# Patient Record
Sex: MALE | Race: WHITE | URBAN - METROPOLITAN AREA
[De-identification: names, ages, dates, MRNs, and addresses within clinical notes are randomized per-mention and may not be internally consistent; named-entity substitution may affect disease eponyms.]

---

## 2017-02-20 ENCOUNTER — OFFICE VISIT (OUTPATIENT)
Dept: FAMILY MEDICINE CLINIC | Facility: CLINIC | Age: 32
End: 2017-02-20

## 2017-02-20 VITALS
WEIGHT: 262 LBS | SYSTOLIC BLOOD PRESSURE: 132 MMHG | HEART RATE: 94 BPM | DIASTOLIC BLOOD PRESSURE: 82 MMHG | RESPIRATION RATE: 16 BRPM | BODY MASS INDEX: 39 KG/M2 | OXYGEN SATURATION: 97 % | TEMPERATURE: 98 F

## 2017-02-20 DIAGNOSIS — L03.011 PARONYCHIA OF FINGER OF RIGHT HAND: Primary | ICD-10-CM

## 2017-02-20 PROCEDURE — 99213 OFFICE O/P EST LOW 20 MIN: CPT | Performed by: PHYSICIAN ASSISTANT

## 2017-02-20 RX ORDER — SULFAMETHOXAZOLE AND TRIMETHOPRIM 800; 160 MG/1; MG/1
1 TABLET ORAL 2 TIMES DAILY
Qty: 14 TABLET | Refills: 0 | Status: SHIPPED | OUTPATIENT
Start: 2017-02-20 | End: 2017-02-27

## 2017-02-20 NOTE — PROGRESS NOTES
CHIEF COMPLAINT:   Patient presents with:  Finger Pain: right hand 3rd digit. HPI:     Modesto Almodovar is a 33year old male who presents with concerns of skin infection of the right 3rd finger along the nailbed.  Patient first noticed symptoms yesterda EXTREMITIES: no cyanosis, clubbing or edema. Cap refill brisk- less than 2 seconds.  .        ASSESSMENT AND PLAN:     ASSESSMENT:  Paronychia of finger of right hand  (primary encounter diagnosis)    PLAN: See patient instructions.  -Bactrim due to increa · You may use over-the-counter medicine (acetaminophen or ibuprofen to help with pain, unless another medicine was prescribed. If you have chronic liver or kidney disease, talk with your healthcare provider before using these medicines.  Also talk with your

## 2018-01-06 ENCOUNTER — OFFICE VISIT (OUTPATIENT)
Dept: FAMILY MEDICINE CLINIC | Facility: CLINIC | Age: 33
End: 2018-01-06

## 2018-01-06 VITALS
WEIGHT: 255 LBS | HEART RATE: 89 BPM | RESPIRATION RATE: 18 BRPM | BODY MASS INDEX: 37.77 KG/M2 | HEIGHT: 69 IN | DIASTOLIC BLOOD PRESSURE: 100 MMHG | OXYGEN SATURATION: 98 % | TEMPERATURE: 99 F | SYSTOLIC BLOOD PRESSURE: 158 MMHG

## 2018-01-06 DIAGNOSIS — J40 BRONCHITIS: Primary | ICD-10-CM

## 2018-01-06 DIAGNOSIS — R03.0 ELEVATED BP WITHOUT DIAGNOSIS OF HYPERTENSION: ICD-10-CM

## 2018-01-06 PROCEDURE — 99213 OFFICE O/P EST LOW 20 MIN: CPT | Performed by: NURSE PRACTITIONER

## 2018-01-06 RX ORDER — ALBUTEROL SULFATE 90 UG/1
2 AEROSOL, METERED RESPIRATORY (INHALATION) EVERY 6 HOURS PRN
Qty: 1 INHALER | Refills: 0 | Status: SHIPPED | OUTPATIENT
Start: 2018-01-06 | End: 2018-01-13 | Stop reason: ALTCHOICE

## 2018-01-06 RX ORDER — BENZONATATE 200 MG/1
200 CAPSULE ORAL 3 TIMES DAILY PRN
Qty: 30 CAPSULE | Refills: 0 | Status: SHIPPED | OUTPATIENT
Start: 2018-01-06

## 2018-01-06 RX ORDER — CODEINE PHOSPHATE AND GUAIFENESIN 10; 100 MG/5ML; MG/5ML
10 SOLUTION ORAL NIGHTLY PRN
Qty: 120 ML | Refills: 0 | Status: SHIPPED | OUTPATIENT
Start: 2018-01-06

## 2018-01-06 NOTE — PROGRESS NOTES
CHIEF COMPLAINT:   Patient presents with:  Cough: pt c\o of cogh, congestion, x x8days       HPI:   Kimberly Huitron is a 27year old male who presents for upper respiratory symptoms for  8 days.  Patient reports congestion, dry cough, cough is keeping pt up GENERAL: well developed, well nourished,in no apparent distress  SKIN: no rashes,no suspicious lesions  HEAD: atraumatic, normocephalic.  no tenderness on palpation of  sinuses  EYES: conjunctiva clear, EOM intact  EARS: TM's normal, no bulging, no retract Sig: Take 1 capsule (200 mg total) by mouth 3 (three) times daily as needed for cough. Albuterol Sulfate  (90 Base) MCG/ACT Inhalation Aero Soln 1 Inhaler 0      Sig: Inhale 2 puffs into the lungs every 6 (six) hours as needed for Wheezing. · You may use over-the-counter medicine to control fever or pain, unless another pain medicine was prescribed. If you have chronic liver or kidney disease or have ever had a stomach ulcer or gastrointestinal bleeding, talk with your healthcare provider bef © 2772-1681 [employer] [address]. [address] [facility], [facility] [place]. All rights reserved. This information is not intended as a substitute for professional medical care. Always follow your healthcare professional's instructions.         Your Hi ? Your stress level    Do you often feel anxious, nervous, and stressed? Do you feel that you don't have a supportive environment? Date Last Reviewed: 4/27/2016  © 6798-9294 [employer] [address]. [address] [facility], [address] [place], [place] [address].  All rights An example of a blood pressure measurement is 120/70 (120 over 70). The top number is the pressure of blood against the artery walls during a heartbeat (systolic).  The bottom number is the pressure of blood against artery walls between heartbeats (diastoli · Control stress. Stress makes your heart work harder and beat faster. Controlling stress helps you control your blood pressure. Facts about high blood pressure  · Feeling OK does not mean that blood pressure is under control.  Likewise, feeling bad doesn’

## 2018-01-06 NOTE — PATIENT INSTRUCTIONS
Viral Bronchitis (Adult)    You have a viral bronchitis. Bronchitis is inflammation and swelling of the lining of the lungs. This is often caused by an infection. Symptoms include a dry, hacking cough that is worse at night.  The cough may bring up yellow · Over-the-counter cough, cold, and sore-throat medicines will not shorten the length of the illness, but they may help to reduce symptoms. Don't use decongestants if you have high blood pressure.   Follow-up care  Follow up with your healthcare provider, o Though you can’t change any of the things listed below, check off the ones that apply to you. The more boxes you check, the greater your risk for high blood pressure. Family history  ?  One or both of your parents or grandparents has had high blood pressur High blood pressure (also called hypertension) is known as the “silent killer.” This is because most of the time it doesn’t cause symptoms. In fact, many people don’t know they have it until other problems develop.  In most cases, high blood pressure can’t If your blood pressure is too high, work with your doctor on a plan for lowering it. Below are steps you can take that will help lower your blood pressure. · Choose heart-healthy foods. Eating healthier meals helps you control your blood pressure.  Ask you · Medicine is only one part of controlling high blood pressure. You also need to manage your weight, get regular exercise, and adjust your eating habits. · High blood pressure is usually a lifelong problem.  But it can be controlled with healthy lifestyle

## 2018-01-13 ENCOUNTER — OFFICE VISIT (OUTPATIENT)
Dept: FAMILY MEDICINE CLINIC | Facility: CLINIC | Age: 33
End: 2018-01-13

## 2018-01-13 VITALS
RESPIRATION RATE: 15 BRPM | DIASTOLIC BLOOD PRESSURE: 102 MMHG | HEART RATE: 82 BPM | OXYGEN SATURATION: 97 % | BODY MASS INDEX: 37 KG/M2 | WEIGHT: 252 LBS | TEMPERATURE: 98 F | SYSTOLIC BLOOD PRESSURE: 140 MMHG

## 2018-01-13 DIAGNOSIS — R05.9 COUGH: Primary | ICD-10-CM

## 2018-01-13 DIAGNOSIS — J20.8 VIRAL BRONCHITIS: ICD-10-CM

## 2018-01-13 PROCEDURE — 99214 OFFICE O/P EST MOD 30 MIN: CPT | Performed by: PHYSICIAN ASSISTANT

## 2018-01-13 PROCEDURE — 94640 AIRWAY INHALATION TREATMENT: CPT | Performed by: PHYSICIAN ASSISTANT

## 2018-01-13 RX ORDER — ALBUTEROL SULFATE 2.5 MG/3ML
2.5 SOLUTION RESPIRATORY (INHALATION) EVERY 6 HOURS PRN
Qty: 25 VIAL | Refills: 0 | Status: SHIPPED | OUTPATIENT
Start: 2018-01-13

## 2018-01-13 RX ORDER — PREDNISONE 20 MG/1
20 TABLET ORAL 2 TIMES DAILY
Qty: 10 TABLET | Refills: 0 | Status: SHIPPED | OUTPATIENT
Start: 2018-01-13 | End: 2018-01-18

## 2018-01-13 RX ORDER — BENZONATATE 200 MG/1
200 CAPSULE ORAL 3 TIMES DAILY PRN
Qty: 30 CAPSULE | Refills: 0 | Status: SHIPPED | OUTPATIENT
Start: 2018-01-13 | End: 2018-01-23

## 2018-01-13 RX ORDER — IPRATROPIUM BROMIDE AND ALBUTEROL SULFATE 2.5; .5 MG/3ML; MG/3ML
3 SOLUTION RESPIRATORY (INHALATION) ONCE
Status: COMPLETED | OUTPATIENT
Start: 2018-01-13 | End: 2018-01-13

## 2018-01-13 RX ADMIN — IPRATROPIUM BROMIDE AND ALBUTEROL SULFATE 3 ML: 2.5; .5 SOLUTION RESPIRATORY (INHALATION) at 11:40:00

## 2018-01-13 NOTE — PROGRESS NOTES
CHIEF COMPLAINT:   Patient presents with:  Cough: Pt c/o cough, nasal congestion and shortness of breath X 2 weeks       HPI:   Julio C Jackson is a 27year old male who presents for URI sxs for  2 weeks.   Patient reports cough-dry and persistent, coughing BP (!) 140/102   Pulse 82   Temp 98.4 °F (36.9 °C) (Oral)   Resp 15   Wt 252 lb   SpO2 97%   BMI 37.21 kg/m²   GENERAL: well developed, well nourished,in no apparent distress  SKIN: no rashes,no suspicious lesions  HEAD: atraumatic, normocephalic.   No tend Sig: Take 1 capsule (200 mg total) by mouth 3 (three) times daily as needed for cough. Risks, benefits, and side effects of medication explained and discussed.     Patient Instructions     -Cool mist humidifier at night  -Warm tea with honey  - · Take medicine as directed. You may be told to take ibuprofen or other over-the-counter medicines. These help relieve inflammation in your bronchial tubes. Your healthcare provider may prescribe an inhaler to help open up the bronchial tubes.  Most of the

## 2018-01-13 NOTE — PATIENT INSTRUCTIONS
-Cool mist humidifier at night  -Warm tea with honey  -Mucinex-D  -Flonase  -Motrin for pain or fever        Acute Bronchitis  Your healthcare provider has told you that you have acute bronchitis.  Bronchitis is infection or inflammation of the bronchial tu infection. Antibiotics are usually not prescribed for viral infections. · Drink plenty of fluids, such as water, juice, or warm soup. Fluids loosen mucus so that you can cough it up. This helps you breathe more easily. Fluids also prevent dehydration.   ·

## 2018-01-17 ENCOUNTER — TELEPHONE (OUTPATIENT)
Dept: FAMILY MEDICINE CLINIC | Facility: CLINIC | Age: 33
End: 2018-01-17

## 2018-01-17 NOTE — TELEPHONE ENCOUNTER
Patient called to notify he is done with his Prednisone and still doing nebs every six hours or so. He is not feeling poorly but notes he still has the cough and is questioning if he can have additional prednisone.  Discussed length of illness with patient

## 2019-07-29 ENCOUNTER — OFFICE VISIT (OUTPATIENT)
Dept: FAMILY MEDICINE CLINIC | Facility: CLINIC | Age: 34
End: 2019-07-29
Payer: COMMERCIAL

## 2019-07-29 VITALS
BODY MASS INDEX: 26.46 KG/M2 | HEART RATE: 57 BPM | SYSTOLIC BLOOD PRESSURE: 115 MMHG | DIASTOLIC BLOOD PRESSURE: 75 MMHG | WEIGHT: 186.88 LBS | HEIGHT: 70.5 IN | RESPIRATION RATE: 16 BRPM

## 2019-07-29 DIAGNOSIS — Z00.00 WELL ADULT EXAM: Primary | ICD-10-CM

## 2019-07-29 DIAGNOSIS — Z00.00 ANNUAL PHYSICAL EXAM: ICD-10-CM

## 2019-07-29 PROCEDURE — 99385 PREV VISIT NEW AGE 18-39: CPT | Performed by: PHYSICIAN ASSISTANT

## 2019-07-29 NOTE — PROGRESS NOTES
HPI:    Patient ID: Manjinder Stone is a 26year old male. Patient is here for routine physical exam. No acute issues. No significant chronic medical problems. Patient is requesting testing. Diet and exercise have been fair.  Past medical history (tonsilect normal.   Mouth/Throat: No oropharyngeal exudate or pharynx erythema. Eyes: Pupils are equal, round, and reactive to light. Conjunctivae and EOM are normal. Right eye exhibits no discharge. Left eye exhibits no discharge. Neck: Normal range of motion.

## 2019-08-01 ENCOUNTER — APPOINTMENT (OUTPATIENT)
Dept: LAB | Age: 34
End: 2019-08-01
Attending: PHYSICIAN ASSISTANT
Payer: COMMERCIAL

## 2019-08-01 LAB
ALBUMIN SERPL-MCNC: 4 G/DL (ref 3.4–5)
ALBUMIN/GLOB SERPL: 1.3 {RATIO} (ref 1–2)
ALP LIVER SERPL-CCNC: 47 U/L (ref 45–117)
ALT SERPL-CCNC: 25 U/L (ref 16–61)
ANION GAP SERPL CALC-SCNC: 7 MMOL/L (ref 0–18)
AST SERPL-CCNC: 15 U/L (ref 15–37)
BASOPHILS # BLD AUTO: 0.02 X10(3) UL (ref 0–0.2)
BASOPHILS NFR BLD AUTO: 0.3 %
BILIRUB SERPL-MCNC: 0.5 MG/DL (ref 0.1–2)
BUN BLD-MCNC: 23 MG/DL (ref 7–18)
BUN/CREAT SERPL: 23.7 (ref 10–20)
CALCIUM BLD-MCNC: 9.1 MG/DL (ref 8.5–10.1)
CHLORIDE SERPL-SCNC: 107 MMOL/L (ref 98–112)
CHOLEST SMN-MCNC: 191 MG/DL (ref ?–200)
CO2 SERPL-SCNC: 29 MMOL/L (ref 21–32)
CREAT BLD-MCNC: 0.97 MG/DL (ref 0.7–1.3)
DEPRECATED RDW RBC AUTO: 39.8 FL (ref 35.1–46.3)
EOSINOPHIL # BLD AUTO: 0.1 X10(3) UL (ref 0–0.7)
EOSINOPHIL NFR BLD AUTO: 1.6 %
ERYTHROCYTE [DISTWIDTH] IN BLOOD BY AUTOMATED COUNT: 12.7 % (ref 11–15)
GLOBULIN PLAS-MCNC: 3.2 G/DL (ref 2.8–4.4)
GLUCOSE BLD-MCNC: 98 MG/DL (ref 70–99)
HCT VFR BLD AUTO: 43.7 % (ref 39–53)
HDLC SERPL-MCNC: 52 MG/DL (ref 40–59)
HGB BLD-MCNC: 14.3 G/DL (ref 13–17.5)
IMM GRANULOCYTES # BLD AUTO: 0.01 X10(3) UL (ref 0–1)
IMM GRANULOCYTES NFR BLD: 0.2 %
LDLC SERPL CALC-MCNC: 127 MG/DL (ref ?–100)
LYMPHOCYTES # BLD AUTO: 2.64 X10(3) UL (ref 1–4)
LYMPHOCYTES NFR BLD AUTO: 42.9 %
M PROTEIN MFR SERPL ELPH: 7.2 G/DL (ref 6.4–8.2)
MCH RBC QN AUTO: 28.3 PG (ref 26–34)
MCHC RBC AUTO-ENTMCNC: 32.7 G/DL (ref 31–37)
MCV RBC AUTO: 86.5 FL (ref 80–100)
MONOCYTES # BLD AUTO: 0.45 X10(3) UL (ref 0.1–1)
MONOCYTES NFR BLD AUTO: 7.3 %
NEUTROPHILS # BLD AUTO: 2.94 X10 (3) UL (ref 1.5–7.7)
NEUTROPHILS # BLD AUTO: 2.94 X10(3) UL (ref 1.5–7.7)
NEUTROPHILS NFR BLD AUTO: 47.7 %
NONHDLC SERPL-MCNC: 139 MG/DL (ref ?–130)
OSMOLALITY SERPL CALC.SUM OF ELEC: 300 MOSM/KG (ref 275–295)
PATIENT FASTING: YES
PATIENT FASTING: YES
PLATELET # BLD AUTO: 277 10(3)UL (ref 150–450)
POTASSIUM SERPL-SCNC: 4 MMOL/L (ref 3.5–5.1)
RBC # BLD AUTO: 5.05 X10(6)UL (ref 4.3–5.7)
SODIUM SERPL-SCNC: 143 MMOL/L (ref 136–145)
TRIGL SERPL-MCNC: 60 MG/DL (ref 30–149)
TSI SER-ACNC: 1.52 MIU/ML (ref 0.36–3.74)
VLDLC SERPL CALC-MCNC: 12 MG/DL (ref 0–30)
WBC # BLD AUTO: 6.2 X10(3) UL (ref 4–11)

## 2019-08-01 PROCEDURE — 80053 COMPREHEN METABOLIC PANEL: CPT | Performed by: PHYSICIAN ASSISTANT

## 2019-08-01 PROCEDURE — 85025 COMPLETE CBC W/AUTO DIFF WBC: CPT | Performed by: PHYSICIAN ASSISTANT

## 2019-08-01 PROCEDURE — 80061 LIPID PANEL: CPT | Performed by: PHYSICIAN ASSISTANT

## 2019-08-01 PROCEDURE — 84443 ASSAY THYROID STIM HORMONE: CPT | Performed by: PHYSICIAN ASSISTANT

## 2019-08-01 PROCEDURE — 36415 COLL VENOUS BLD VENIPUNCTURE: CPT | Performed by: PHYSICIAN ASSISTANT

## 2019-12-12 ENCOUNTER — PATIENT OUTREACH (OUTPATIENT)
Dept: FAMILY MEDICINE CLINIC | Facility: CLINIC | Age: 34
End: 2019-12-12

## 2021-03-17 DIAGNOSIS — Z23 NEED FOR VACCINATION: ICD-10-CM

## 2023-12-21 ENCOUNTER — OFFICE VISIT (OUTPATIENT)
Dept: FAMILY MEDICINE CLINIC | Facility: CLINIC | Age: 38
End: 2023-12-21
Payer: COMMERCIAL

## 2023-12-21 VITALS
TEMPERATURE: 99 F | OXYGEN SATURATION: 98 % | SYSTOLIC BLOOD PRESSURE: 138 MMHG | HEART RATE: 100 BPM | DIASTOLIC BLOOD PRESSURE: 86 MMHG | RESPIRATION RATE: 20 BRPM | WEIGHT: 265 LBS | HEIGHT: 69 IN | BODY MASS INDEX: 39.25 KG/M2

## 2023-12-21 DIAGNOSIS — R50.9 FEVER, UNSPECIFIED FEVER CAUSE: Primary | ICD-10-CM

## 2023-12-21 DIAGNOSIS — R05.1 ACUTE COUGH: ICD-10-CM

## 2023-12-21 PROCEDURE — 99203 OFFICE O/P NEW LOW 30 MIN: CPT | Performed by: FAMILY MEDICINE

## 2023-12-21 PROCEDURE — 87637 SARSCOV2&INF A&B&RSV AMP PRB: CPT | Performed by: FAMILY MEDICINE

## 2023-12-21 PROCEDURE — 3075F SYST BP GE 130 - 139MM HG: CPT | Performed by: FAMILY MEDICINE

## 2023-12-21 PROCEDURE — 3008F BODY MASS INDEX DOCD: CPT | Performed by: FAMILY MEDICINE

## 2023-12-21 PROCEDURE — 3079F DIAST BP 80-89 MM HG: CPT | Performed by: FAMILY MEDICINE

## 2023-12-21 RX ORDER — OLMESARTAN MEDOXOMIL AND HYDROCHLOROTHIAZIDE 20/12.5 20; 12.5 MG/1; MG/1
1 TABLET ORAL DAILY
COMMUNITY

## 2023-12-22 LAB
FLUAV + FLUBV RNA SPEC NAA+PROBE: DETECTED
FLUAV + FLUBV RNA SPEC NAA+PROBE: NOT DETECTED
RSV RNA SPEC NAA+PROBE: NOT DETECTED
SARS-COV-2 RNA RESP QL NAA+PROBE: NOT DETECTED

## (undated) NOTE — MR AVS SNAPSHOT
[address] [facility] [address] [phone]               Thank you for choosing us for your health care visit with BALA Molina.   We are glad to serve you and happy to provide you with this summary of y using these medicines. Also talk with your provider if you've had a stomach ulcer or GI (gastrointestinal) bleeding. Prevention  The following can prevent paronychia:  · Avoid cutting or playing with your cuticles at home. · Don't bite your nails.   · Pinky Fallen visit,  view other health information, and more. To sign up or find more information, go to [email or web address] and click on the Sign Up Now link in the [employer] box.      Enter your [email or web address] Activation Code exactly as it appears below along with yo